# Patient Record
Sex: FEMALE | Race: WHITE | NOT HISPANIC OR LATINO | Employment: FULL TIME | URBAN - METROPOLITAN AREA
[De-identification: names, ages, dates, MRNs, and addresses within clinical notes are randomized per-mention and may not be internally consistent; named-entity substitution may affect disease eponyms.]

---

## 2021-02-15 ENCOUNTER — CONSULT (OUTPATIENT)
Dept: CARDIOLOGY | Facility: CLINIC | Age: 31
End: 2021-02-15

## 2021-02-15 VITALS
SYSTOLIC BLOOD PRESSURE: 110 MMHG | HEIGHT: 65 IN | WEIGHT: 125 LBS | DIASTOLIC BLOOD PRESSURE: 62 MMHG | HEART RATE: 86 BPM | OXYGEN SATURATION: 99 % | BODY MASS INDEX: 20.83 KG/M2

## 2021-02-15 DIAGNOSIS — R55 SYNCOPE AND COLLAPSE: Primary | ICD-10-CM

## 2021-02-15 PROCEDURE — 99203 OFFICE O/P NEW LOW 30 MIN: CPT | Performed by: PHYSICIAN ASSISTANT

## 2021-02-15 PROCEDURE — 93000 ELECTROCARDIOGRAM COMPLETE: CPT | Performed by: PHYSICIAN ASSISTANT

## 2021-02-15 RX ORDER — FAMOTIDINE 20 MG/1
20 TABLET, FILM COATED ORAL 2 TIMES DAILY
COMMUNITY
Start: 2021-02-03 | End: 2021-03-30

## 2021-02-15 RX ORDER — LEVONORGESTREL AND ETHINYL ESTRADIOL 0.15-0.03
1 KIT ORAL DAILY
COMMUNITY
Start: 2021-02-08

## 2021-02-15 RX ORDER — OMEPRAZOLE 20 MG/1
20 CAPSULE, DELAYED RELEASE ORAL DAILY
COMMUNITY
Start: 2021-02-03

## 2021-02-15 NOTE — PROGRESS NOTES
[facility]  INITIAL OFFICE CONSULT      Clarissa Rivera  1990  PCP: Julia Fernandez APRN    SUBJECTIVE:   Clarissa Rivera is a 31 y.o. female seen for a consultation visit regarding the following:     Chief Complaint:   Chief Complaint   Patient presents with   • Dizziness          Consultation is requested by ELI Medina for evaluation of Dizziness    History:  Pleasant 31-year-old female presents today for evaluation regarding near syncope and variable heart rate followed by apple watch.  She patient reports that she has had difficulty with heartburn and reflux.  She has had this worked up in the past and she is trying to take medication continues to have symptoms.  She states with this pain in her stomach she has had episodes where she feels dizzy lightheaded nauseous and weak and diaphoretic.  She had 1 episode occurred at work a week ago while she sitting in her desk.  She also notices on her Apple Watch recordings that she has a high variability of her heart rate.  Symptoms her heart rate is low sometimes on the faster side.  She denies any chest pain or chest tightness exertion.  She has a heart failure symptoms.  She does report that she is also had these symptoms of near syncope dizziness in the hot shower or hot weather.  She has not had any saran syncope.  She always has morning cough with symptoms.    Cardiac PMH: (Old records have been reviewed and summarized below)  1. Near syncope- Clinically suggest Vasovagal events  2. Marginal SBP  3. GERD, PUD-GI to see.   4. Palpitations      Past Medical History, Past Surgical History, Family history, Social History, and Medications were all reviewed with the patient today and updated as necessary.     Current Outpatient Medications   Medication Sig Dispense Refill   • famotidine (PEPCID) 20 MG tablet Take 20 mg by mouth 2 (Two) Times a Day.     • levonorgestrel-ethinyl estradiol (SEASONALE) 0.15-0.03 MG per tablet  "Take 1 tablet by mouth Daily. as directed     • omeprazole (priLOSEC) 20 MG capsule Take 20 mg by mouth Daily.       No current facility-administered medications for this visit.      Allergies   Allergen Reactions   • Ciprofloxacin Hives   • Erythromycin Nausea And Vomiting   • Keflex [Cephalexin] Rash         Past Medical History:   Diagnosis Date   • Acid reflux    • Chicken pox      Past Surgical History:   Procedure Laterality Date   • REPAIR ANKLE LIGAMENT     • TONSILLECTOMY     • WISDOM TOOTH EXTRACTION       Family History   Problem Relation Age of Onset   • No Known Problems Mother    • No Known Problems Sister      Social History     Tobacco Use   • Smoking status: Never Smoker   • Smokeless tobacco: Never Used   Substance Use Topics   • Alcohol use: Not Currently       ROS:  Review of Symptoms:  General: Near syncope  Skin: no rashes, lumps, or other skin changes  HEENT: no dizziness, lightheadedness, or vision changes  Respiratory: no cough or hemoptysis  Cardiovascular: no palpitations, and tachycardia  Gastrointestinal: no black/tarry stools or diarrhea  Urinary: no change in frequency or urgency  Peripheral Vascular: no claudication or leg cramps  Musculoskeletal: no muscle or joint pain/stiffness  Psychiatric: no depression or excessive stress  Neurological: no sensory or motor loss, no syncope  Hematologic: no anemia, easy bruising or bleeding  Endocrine: no thyroid problems, nor heat or cold intolerance         PHYSICAL EXAM:   /62 (BP Location: Right arm, Patient Position: Sitting)   Pulse 86   Ht 165.1 cm (65\")   Wt 56.7 kg (125 lb)   SpO2 99%   BMI 20.80 kg/m²      Wt Readings from Last 5 Encounters:   02/15/21 56.7 kg (125 lb)     BP Readings from Last 5 Encounters:   02/15/21 110/62       General-Well Nourished, Well developed  Eyes - PERRLA  Neck- supple, No mass  CV- regular rate and rhythm, no MRG  Lung- clear bilaterally  Abd- soft, +BS  Musc/skel - Norm strength and range of " motion  Skin- warm and dry  Neuro - Alert & Oriented x 3, appropriate mood.    Patient's external notes were reviewed.  Independent interpretation of test performed by another physician in facility were reviewed.  Outside laboratory data was also reviewed.    Medical problems and test results were reviewed with the patient today.     No results found for this or any previous visit.      No results found for: CHOL, HDL, HDLC, LDL, LDLC, VLDL    EKG:  (EKG/Tracing has been independently visualized by me and summarized below)      ECG 12 Lead    Date/Time: 2/15/2021 10:00 AM  Performed by: Shen Caba PA  Authorized by: Shen Caba PA   Comparison: compared with previous ECG   Similar to previous ECG  Rhythm: sinus rhythm  Rate: normal  Conduction: conduction normal  ST Segments: ST segments normal  T Waves: T waves normal  QRS axis: normal  Other: no other findings    Clinical impression: normal ECG            ASSESSMENT   1. Near syncope syncope-clinically suggest vasovagal  2. Marginal SBP  3. GERD.   3.  Palpitations      PLAN  · Increase hydration Gatorade Powerade.  Patient already avoiding caffeine at this time.  We have asked her to obtain a blood pressure cuff to monitor blood pressure.  · Treatment for GERD, possible peptic ulcer disease her symptoms of vasovagal events are related to abdominal discomfort.  · We will pursue a ZIO monitor review for any significant arrhythmias.  · Return to follow-up with our office in 6 weeks or sooner as needed.            Cardiology/Electrophysiology  02/15/21  09:59 EST  Will Gertrudis DANIELS

## 2021-03-16 DIAGNOSIS — R55 SYNCOPE AND COLLAPSE: Primary | ICD-10-CM

## 2021-03-30 ENCOUNTER — TELEPHONE (OUTPATIENT)
Dept: CARDIOLOGY | Facility: CLINIC | Age: 31
End: 2021-03-30

## 2021-03-30 ENCOUNTER — OFFICE VISIT (OUTPATIENT)
Dept: CARDIOLOGY | Facility: CLINIC | Age: 31
End: 2021-03-30

## 2021-03-30 VITALS
DIASTOLIC BLOOD PRESSURE: 72 MMHG | SYSTOLIC BLOOD PRESSURE: 110 MMHG | OXYGEN SATURATION: 99 % | WEIGHT: 128 LBS | HEART RATE: 88 BPM | HEIGHT: 65 IN | BODY MASS INDEX: 21.33 KG/M2

## 2021-03-30 DIAGNOSIS — R55 VASOVAGAL EPISODE: Primary | ICD-10-CM

## 2021-03-30 PROCEDURE — 99213 OFFICE O/P EST LOW 20 MIN: CPT | Performed by: PHYSICIAN ASSISTANT

## 2021-03-30 RX ORDER — SUCRALFATE 1 G/1
TABLET ORAL 2 TIMES DAILY
COMMUNITY
Start: 2021-03-07

## 2021-03-30 NOTE — TELEPHONE ENCOUNTER
----- Message from KAILA Dominguez sent at 3/30/2021  9:52 AM EDT -----  Please get labs from Family practice

## 2021-03-30 NOTE — PROGRESS NOTES
[facility] at [facility]   OFFICE NOTE      Clarissa Rivera  1990  PCP: Jim, January, APRN    SUBJECTIVE:   Clarissa Rivera is a 31 y.o. female seen for a follow up visit regarding the following:     CC:Dizziness    HPI:   Since we last saw the pt, pt denies any syncope episodes, SOB, CP, LH, and dizziness. Denies any hospitalizations, ER visits, bleeding, or TIA/CVA symptoms. Overall feels well.    Cardiac PMH: (Old records have been reviewed and summarized below)  1. Near syncope- Clinically suggest Vasovagal events  2. Zio monitor-Normal No arrhthymias   3. Marginal SBP  4. GERD, PUD-GI to see.   5. Palpitations    Past Medical History, Past Surgical History, Family history, Social History, and Medications were all reviewed with the patient today and updated as necessary.       Current Outpatient Medications:   •  famotidine (PEPCID) 20 MG tablet, Take 20 mg by mouth 2 (Two) Times a Day., Disp: , Rfl:   •  levonorgestrel-ethinyl estradiol (SEASONALE) 0.15-0.03 MG per tablet, Take 1 tablet by mouth Daily. as directed, Disp: , Rfl:   •  omeprazole (priLOSEC) 20 MG capsule, Take 20 mg by mouth Daily., Disp: , Rfl:       Allergies   Allergen Reactions   • Ciprofloxacin Hives   • Erythromycin Nausea And Vomiting   • Keflex [Cephalexin] Rash     There is no problem list on file for this patient.    Past Medical History:   Diagnosis Date   • Acid reflux    • Chicken pox      Past Surgical History:   Procedure Laterality Date   • REPAIR ANKLE LIGAMENT     • TONSILLECTOMY     • WISDOM TOOTH EXTRACTION       Family History   Problem Relation Age of Onset   • No Known Problems Mother    • No Known Problems Sister      Social History     Tobacco Use   • Smoking status: Never Smoker   • Smokeless tobacco: Never Used   Substance Use Topics   • Alcohol use: Not Currently       ROS:  Review of Symptoms:  General: no recent weight loss/gain, weakness or fatigue  Skin: no rashes, lumps, or other skin  changes  HEENT: no dizziness, lightheadedness, or vision changes  Respiratory: no cough or hemoptysis  Cardiovascular: no palpitations, and tachycardia  Gastrointestinal: no black/tarry stools or diarrhea  Urinary: no change in frequency or urgency  Peripheral Vascular: no claudication or leg cramps  Musculoskeletal: no muscle or joint pain/stiffness  Psychiatric: no depression or excessive stress  Neurological: no sensory or motor loss, no syncope  Hematologic: no anemia, easy bruising or bleeding  Endocrine: no thyroid problems, nor heat or cold intolerance    PHYSICAL EXAM:    There were no vitals taken for this visit.       Wt Readings from Last 5 Encounters:   02/15/21 56.7 kg (125 lb)       BP Readings from Last 5 Encounters:   02/15/21 110/62       General appearance - Alert, well appearing, and in no distress   Mental status - Affect appropriate to mood.  Eyes - Sclerae anicteric,  ENMT - Hearing grossly normal bilaterally, Dental hygiene good.  Neck - Carotids upstroke normal bilaterally, no bruits, no JVD.  Resp - Clear to auscultation, no wheezes, rales or rhonchi, symmetric air entry.  Heart - Normal rate, regular rhythm, normal S1, S2, no murmurs, rubs, clicks or gallops.  GI - Soft, nontender, nondistended, no masses or organomegaly.  Neurological - Grossly intact - normal speech, no focal findings  Musculoskeletal - No joint tenderness, deformity or swelling, no muscular tenderness noted.  Extremities - Peripheral pulses normal, no pedal edema, no clubbing or cyanosis.  Skin - Normal coloration and turgor.  Psych -  oriented to person, place, and time.    Medical problems and test results were reviewed with the patient today.     No results found for this or any previous visit (from the past 672 hour(s)).    LABS: Labs reviewed from PCP, normal no anemia no thyroid disease normal kidney function.    ASSESSMENT   1. Vasovagal syncope: No recurrent events. Zio monitor negative for arrhythmias.   2.  Marginal SBP: Increase hydration, sodium.   3. GERD, PUD: Carafate 1 gram . Recent EGD. Symptoms improved.     PLAN  · No recurrent episodes.  Patient reports symptoms improved after treating GERD symptoms and peptic ulcer disease.  Recommend increasing hydration, Sodium intake.   · Return for follow up 8 months or sooner as needed.             3/30/2021  09:35 EDT    Will Gertrudis DANIELS